# Patient Record
Sex: MALE | Race: WHITE | Employment: UNEMPLOYED | URBAN - METROPOLITAN AREA
[De-identification: names, ages, dates, MRNs, and addresses within clinical notes are randomized per-mention and may not be internally consistent; named-entity substitution may affect disease eponyms.]

---

## 2020-05-04 ENCOUNTER — HOSPITAL ENCOUNTER (EMERGENCY)
Age: 35
Discharge: HOME | End: 2020-05-04
Attending: EMERGENCY MEDICINE
Payer: COMMERCIAL

## 2020-05-04 ENCOUNTER — APPOINTMENT (OUTPATIENT)
Dept: GENERAL RADIOLOGY | Age: 35
End: 2020-05-04
Attending: EMERGENCY MEDICINE
Payer: COMMERCIAL

## 2020-05-04 VITALS
HEIGHT: 71 IN | RESPIRATION RATE: 18 BRPM | DIASTOLIC BLOOD PRESSURE: 89 MMHG | TEMPERATURE: 98.6 F | SYSTOLIC BLOOD PRESSURE: 141 MMHG | WEIGHT: 175 LBS | OXYGEN SATURATION: 99 % | HEART RATE: 126 BPM | BODY MASS INDEX: 24.5 KG/M2

## 2020-05-04 DIAGNOSIS — S93.601A SPRAIN OF RIGHT FOOT, INITIAL ENCOUNTER: Primary | ICD-10-CM

## 2020-05-04 PROCEDURE — 99283 EMERGENCY DEPT VISIT LOW MDM: CPT

## 2020-05-04 PROCEDURE — 75810000053 HC SPLINT APPLICATION

## 2020-05-04 PROCEDURE — 73630 X-RAY EXAM OF FOOT: CPT

## 2020-05-04 RX ORDER — MELOXICAM 15 MG/1
15 TABLET ORAL DAILY
Qty: 30 TAB | Refills: 0 | Status: SHIPPED | OUTPATIENT
Start: 2020-05-04

## 2020-05-04 RX ORDER — ACETAMINOPHEN AND CODEINE PHOSPHATE 300; 30 MG/1; MG/1
1 TABLET ORAL
Qty: 20 TAB | Refills: 0 | Status: SHIPPED | OUTPATIENT
Start: 2020-05-04 | End: 2020-05-09

## 2020-05-04 NOTE — DISCHARGE INSTRUCTIONS
Rest ice compression and elevation. Weight-bear as tolerated. If your symptoms do not start to improve in 7 days call and follow-up with the orthopedic referral for possible repeat x-ray in 1 week. Take the meloxicam once daily with food and take the Tylenol with codeine as needed for severe pain. Risk of opioid analgesics include, but are not limited to: Overdose they can stop or slow your breathing and lead to death; fractures from falls; drowsiness leading to injury; tolerance, dependence and addiction. You should not operate any motorized vehicles or work from a height greater than ground level when taking opioid analgesics as they increase your fall risks.

## 2020-05-04 NOTE — ED NOTES
I have reviewed discharge instructions with the patient. The patient verbalized understanding. Patient left ED via Discharge Method: ambulatory to Home with fiance. Opportunity for questions and clarification provided. Patient given 2 scripts. Pt instructed to follow up with ortho if symptoms progressively worsen after a few days. Pt ambulatory off unit without signs of acute distress. To continue your aftercare when you leave the hospital, you may receive an automated call from our care team to check in on how you are doing. This is a free service and part of our promise to provide the best care and service to meet your aftercare needs.  If you have questions, or wish to unsubscribe from this service please call [phone]. Thank you for Choosing our [facility].

## 2020-05-04 NOTE — ED TRIAGE NOTES
Pt was walking down a hill when foot got stuck in a hole and \"turned over\". pt heard a pop sound.  Now having pain in top of foot when attempting to walk

## 2020-05-04 NOTE — ED PROVIDER NOTES
Patient is a pleasant 37-year-old male who presented to emerge department day planing of pain in the midfoot on the right. The patient states that he had a couple cups of coffee in his hand and he [place] when his foot went into a hole causing him to go into an extreme dorsiflexed position. Patient states that if the foot is at rest it is tolerable but when he tries to bear weight on it the pain increases. The patient states he did take 2 Aleve and Goody's powder but the pain has persisted so he decided to come and have it evaluated for possible fracture. Patient denies any blunt head trauma or loss of consciousness with this fall. History reviewed. No pertinent past medical history. History reviewed. No pertinent surgical history. History reviewed. No pertinent family history.     Social History     Socioeconomic History    Marital status: SINGLE     Spouse name: Not on file    Number of children: Not on file    Years of education: Not on file    Highest education level: Not on file   Occupational History    Not on file   Social Needs    Financial resource strain: Not on file    Food insecurity     Worry: Not on file     Inability: Not on file    Transportation needs     Medical: Not on file     Non-medical: Not on file   Tobacco Use    Smoking status: Not on file   Substance and Sexual Activity    Alcohol use: Yes     Comment: occasionally    Drug use: Not Currently    Sexual activity: Not on file   Lifestyle    Physical activity     Days per week: Not on file     Minutes per session: Not on file    Stress: Not on file   Relationships    Social connections     Talks on phone: Not on file     Gets together: Not on file     Attends Anabaptism service: Not on file     Active member of club or organization: Not on file     Attends meetings of clubs or organizations: Not on file     Relationship status: Not on file    Intimate partner violence Fear of current or ex partner: Not on file     Emotionally abused: Not on file     Physically abused: Not on file     Forced sexual activity: Not on file   Other Topics Concern    Not on file   Social History Narrative    Not on file         ALLERGIES: Ceclor [cefaclor]    Review of Systems   Constitutional: Negative. Musculoskeletal: Positive for joint swelling. Skin: Negative. Neurological: Negative. Hematological: Negative. Vitals:    05/04/20 0406 05/04/20 0407 05/04/20 0410 05/04/20 0419   BP: (!) 167/95   (!) 171/98   Pulse: (!) 126      Resp: 18      Temp: 98.6 °F (37 °C)      SpO2: 100% 100% 100% 99%   Weight: 79.4 kg (175 lb)      Height: 5' 11\" (1.803 m)               Physical Exam     GENERAL:The patient is well nourished, and well-hydrated. VITAL SIGNS: Heart rate, blood pressure, respiratory rate reviewed as recorded in  nurse's notes  EYES: Pupils reactive. Extraocular motion intact. No conjunctival redness or drainage. LUNGS: No accessory muscle use  CARDIOVASCULAR: Regular rate and rhythm  EXTREMITIES: There is some swelling to the right midfoot dorsally. There is tenderness to palpation over this area but no bony deformity appreciated. He has some limited dorsiflexion plantarflexion secondary to pain with movement. The patient has no tenderness to palpation over the medial lateral malleoli on right foot. His capillary refill is brisk. The patient has no decreased sensation noted. NEUROLOGIC: Cranial nerve exam reveals face is symmetrical, tongue is midline  speech is clear. No focal deficits noted  SKIN: No rash or petechiae. Good skin turgor palpated. PSYCHIATRIC: Alert and oriented. Appropriate behavior and judgment.       MDM  Number of Diagnoses or Management Options  Diagnosis management comments: Sprain, strain, tendon injury, contusion,    Abrasion, laceration, neurovascular injury, foreign body    Fracture, open fracture, dislocation, joint separation, articular surface injury,         Amount and/or Complexity of Data Reviewed  Tests in the radiology section of CPT®: reviewed and ordered           Splint, Ankle  Date/Time: 5/4/2020 4:47 AM  Performed by: Bryan Macias DO  Authorized by: Bryan Macias DO     Consent:     Consent obtained:  Verbal    Consent given by:  Patient    Risks discussed:  Discoloration, numbness, pain and swelling    Alternatives discussed:  No treatment, observation and delayed treatment  Pre-procedure details:     Sensation:  Normal  Procedure details:     Laterality:  Right    Location:  Foot    Foot:  R foot    Splint type: Posterior slab. Supplies:  Ortho-Glass and elastic bandage  Post-procedure details:     Pain:  Improved    Sensation:  Normal    Patient tolerance of procedure:   Tolerated well, no immediate complications